# Patient Record
Sex: FEMALE | Race: BLACK OR AFRICAN AMERICAN | Employment: OTHER | URBAN - METROPOLITAN AREA
[De-identification: names, ages, dates, MRNs, and addresses within clinical notes are randomized per-mention and may not be internally consistent; named-entity substitution may affect disease eponyms.]

---

## 2017-10-23 ENCOUNTER — HOSPITAL ENCOUNTER (OUTPATIENT)
Dept: CT IMAGING | Age: 82
Discharge: HOME | End: 2017-10-23
Attending: NURSE PRACTITIONER
Payer: MEDICARE

## 2017-10-23 DIAGNOSIS — R10.32 LLQ ABDOMINAL PAIN: ICD-10-CM

## 2017-10-23 LAB — CREAT UR-MCNC: 0.8 MG/DL (ref 0.6–1.3)

## 2017-10-23 PROCEDURE — 74011636320 HC RX REV CODE- 636/320: Performed by: NURSE PRACTITIONER

## 2017-10-23 PROCEDURE — 82565 ASSAY OF CREATININE: CPT

## 2017-10-23 PROCEDURE — 74177 CT ABD & PELVIS W/CONTRAST: CPT

## 2017-10-23 RX ADMIN — DIATRIZOATE MEGLUMINE AND DIATRIZOATE SODIUM 30 ML: 600; 100 SOLUTION ORAL; RECTAL at 13:22

## 2017-10-23 RX ADMIN — IOPAMIDOL 100 ML: 612 INJECTION, SOLUTION INTRAVENOUS at 15:09

## 2018-02-15 ENCOUNTER — HOSPITAL ENCOUNTER (OUTPATIENT)
Dept: MRI IMAGING | Age: 83
Discharge: HOME | End: 2018-02-15
Attending: HOSPITALIST
Payer: MEDICARE

## 2018-02-15 DIAGNOSIS — M25.561 RIGHT KNEE PAIN: ICD-10-CM

## 2018-02-15 PROCEDURE — 73721 MRI JNT OF LWR EXTRE W/O DYE: CPT

## 2018-02-21 ENCOUNTER — OFFICE VISIT (OUTPATIENT)
Dept: ORTHOPEDIC SURGERY | Facility: CLINIC | Age: 83
End: 2018-02-21

## 2018-02-21 VITALS
TEMPERATURE: 97.4 F | HEART RATE: 36 BPM | HEIGHT: 65 IN | SYSTOLIC BLOOD PRESSURE: 137 MMHG | WEIGHT: 205.8 LBS | DIASTOLIC BLOOD PRESSURE: 52 MMHG | OXYGEN SATURATION: 96 % | BODY MASS INDEX: 34.29 KG/M2 | RESPIRATION RATE: 18 BRPM

## 2018-02-21 DIAGNOSIS — M25.561 CHRONIC PAIN OF RIGHT KNEE: Primary | ICD-10-CM

## 2018-02-21 DIAGNOSIS — G89.29 CHRONIC PAIN OF RIGHT KNEE: Primary | ICD-10-CM

## 2018-02-21 RX ORDER — MELOXICAM 7.5 MG/1
TABLET ORAL
COMMUNITY
Start: 2018-01-29

## 2018-02-21 RX ORDER — LATANOPROST 50 UG/ML
SOLUTION/ DROPS OPHTHALMIC
COMMUNITY
Start: 2018-02-05

## 2018-02-21 RX ORDER — LEVOTHYROXINE SODIUM 150 UG/1
TABLET ORAL
COMMUNITY
Start: 2017-11-29

## 2018-02-21 RX ORDER — GABAPENTIN 300 MG/1
CAPSULE ORAL
COMMUNITY
Start: 2018-01-03

## 2018-02-21 RX ORDER — BRIMONIDINE TARTRATE 2 MG/ML
SOLUTION/ DROPS OPHTHALMIC
COMMUNITY
Start: 2018-02-05

## 2018-02-21 RX ORDER — BISMUTH SUBSALICYLATE 262 MG
1 TABLET,CHEWABLE ORAL DAILY
COMMUNITY

## 2018-02-21 RX ORDER — BETAMETHASONE SODIUM PHOSPHATE AND BETAMETHASONE ACETATE 3; 3 MG/ML; MG/ML
6 INJECTION, SUSPENSION INTRA-ARTICULAR; INTRALESIONAL; INTRAMUSCULAR; SOFT TISSUE ONCE
Qty: 1 ML | Refills: 0
Start: 2018-02-21 | End: 2018-02-21

## 2018-02-21 RX ORDER — LOSARTAN POTASSIUM AND HYDROCHLOROTHIAZIDE 12.5; 5 MG/1; MG/1
TABLET ORAL
COMMUNITY
Start: 2018-01-25

## 2018-02-21 RX ORDER — CHOLECALCIFEROL TAB 125 MCG (5000 UNIT) 125 MCG
TAB ORAL DAILY
COMMUNITY

## 2018-02-21 RX ORDER — GUAIFENESIN 100 MG/5ML
81 LIQUID (ML) ORAL DAILY
COMMUNITY

## 2018-02-21 RX ORDER — METOPROLOL TARTRATE 25 MG/1
TABLET, FILM COATED ORAL
COMMUNITY
Start: 2018-01-25

## 2018-02-21 NOTE — PROGRESS NOTES
Patient: Jada Alejandra Person                MRN: [id number]       SSN: [id number]  YOB: 1935        AGE: 81 y.o. SEX: female  Body mass index is 34.25 kg/(m^2). PCP: No primary care provider on file. 02/21/18  HISTORY: I had the pleasure of seeing Jada Alejandra. She is a very nice lady who looks much younger than her stated age of 81, who has been having right knee pain for a few weeks. She has been avoiding stairs for quite some time. She has some pain at night and pain with activities of daily living. She has had no trauma. She has known atrial fibrillation. Her heart rate was a touch on the slow side today. She is going to be following up with Dr. Elayne Halsted. She denies chest pain or shortness of breath. The right knee pain is moderate and aching. She had an aspiration/injection about six to 10 years ago, which was somewhat efficacious for her. She has noticed she has gotten a little more knock-kneed. The left knee is also a problem but not as severely so. PHYSICAL EXAMINATION:  On examination today, she is a very nice lady in no acute distress. Vitals are stable. She appears much younger than her stated age of 81. She has had no respiratory compromise or indrawing. No scleral icterus or JVD. The hips rotate nicely. Both feet are warm and well perfused. There is no cyanosis or clubbing. She has just slight evidence of neuropathy distally but mildly so. She has some mild effusion in both knees. She has a couple degrees fixed flexion deformity, about 4-5° on the right and about 2° on the left. The calf is nontender. The back is not particularly painful today. 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      PROCEDURE:  Under aseptic conditions and after informed, written consent with a time out, the right knee was injected with 1 cc of the Celestone preparation, i.e. 6 mg, which was well tolerated. PLAN:  I will have her return to see us in about three weeks. We may do the left knee injection if she wishes. I think she may be heading eventually towards total knee replacement. It is really up to her. We will see how she gets along with the nonoperative measures. It has been and absolute pleasure to share in her care, and I appreciate the opportunity to provide opinion and advice with regards to her management. REVIEW OF SYSTEMS:      CON: negative for weight loss, fever  EYE: negative for double vision  ENT: negative for hoarseness  RS:   negative for Tb  GI:    negative for blood in stool  :  negative for blood in urine  Other systems reviewed and noted below. Past Medical History:   Diagnosis Date    A-fib ([facility]     Hypertension        History reviewed. No pertinent family history. Current Outpatient Prescriptions   Medication Sig Dispense Refill    metoprolol tartrate (LOPRESSOR) 25 mg tablet       meloxicam (MOBIC) 7.5 mg tablet       losartan-hydroCHLOROthiazide (HYZAAR) 50-12.5 mg per tablet       levothyroxine (SYNTHROID) 150 mcg tablet       gabapentin (NEURONTIN) 300 mg capsule       latanoprost (XALATAN) 0.005 % ophthalmic solution       brimonidine (ALPHAGAN) 0.2 % ophthalmic solution       aspirin 81 mg chewable tablet Take 81 mg by mouth daily.  cholecalciferol, VITAMIN D3, (VITAMIN D3) 5,000 unit tab tablet Take  by mouth daily.  multivitamin (ONE A DAY) tablet Take 1 Tab by mouth daily. No Known Allergies    History reviewed. No pertinent surgical history. Social History     Social History    Marital status: UNKNOWN     Spouse name: N/A    Number of children: N/A    Years of education: N/A     Occupational History    Not on file.      Social History Main Topics    Smoking status: Former Smoker    Smokeless tobacco: Never Used    Alcohol use No    Drug use: No    Sexual activity: Not on file     Other Topics Concern    Not on file     Social History Narrative    No narrative on file       Visit Vitals    /52    Pulse (!) 36    Temp 97.4 °F (36.3 °C) (Oral)    Resp 18    Ht 5' 5\" (1.651 m)    Wt 205 lb 12.8 oz (93.4 kg)    SpO2 96%    BMI 34.25 kg/m2         PHYSICAL EXAMINATION:  GENERAL: Alert and oriented x3, in no acute distress, well-developed, well-nourished, afebrile. HEART: No JVD. EYES: No scleral icterus   NECK: No significant lymphadenopathy   LUNGS: No respiratory compromise or indrawing  ABDOMEN: Soft, non-tender, non-distended. Electronically signed by:  Mercedes Zepeda MD

## 2018-10-11 ENCOUNTER — HOSPITAL ENCOUNTER (EMERGENCY)
Age: 83
Discharge: HOME | End: 2018-10-12
Attending: EMERGENCY MEDICINE
Payer: MEDICARE

## 2018-10-11 ENCOUNTER — APPOINTMENT (OUTPATIENT)
Dept: CT IMAGING | Age: 83
End: 2018-10-11
Attending: EMERGENCY MEDICINE
Payer: MEDICARE

## 2018-10-11 DIAGNOSIS — K57.92 DIVERTICULITIS: Primary | ICD-10-CM

## 2018-10-11 LAB
ANION GAP SERPL CALC-SCNC: 8 MMOL/L (ref 3–18)
BASOPHILS # BLD: 0 K/UL (ref 0–0.1)
BASOPHILS NFR BLD: 0 % (ref 0–2)
BUN SERPL-MCNC: 14 MG/DL (ref 7–18)
BUN/CREAT SERPL: 15 (ref 12–20)
CALCIUM SERPL-MCNC: 8.6 MG/DL (ref 8.5–10.1)
CHLORIDE SERPL-SCNC: 105 MMOL/L (ref 100–108)
CO2 SERPL-SCNC: 26 MMOL/L (ref 21–32)
CREAT SERPL-MCNC: 0.92 MG/DL (ref 0.6–1.3)
DIFFERENTIAL METHOD BLD: ABNORMAL
EOSINOPHIL # BLD: 0.1 K/UL (ref 0–0.4)
EOSINOPHIL NFR BLD: 1 % (ref 0–5)
ERYTHROCYTE [DISTWIDTH] IN BLOOD BY AUTOMATED COUNT: 15.3 % (ref 11.6–14.5)
GLUCOSE SERPL-MCNC: 115 MG/DL (ref 74–99)
HCT VFR BLD AUTO: 41.2 % (ref 35–45)
HGB BLD-MCNC: 13.4 G/DL (ref 12–16)
LYMPHOCYTES # BLD: 2.3 K/UL (ref 0.9–3.6)
LYMPHOCYTES NFR BLD: 23 % (ref 21–52)
MCH RBC QN AUTO: 27.4 PG (ref 24–34)
MCHC RBC AUTO-ENTMCNC: 32.5 G/DL (ref 31–37)
MCV RBC AUTO: 84.3 FL (ref 74–97)
MONOCYTES # BLD: 0.5 K/UL (ref 0.05–1.2)
MONOCYTES NFR BLD: 5 % (ref 3–10)
NEUTS SEG # BLD: 7.4 K/UL (ref 1.8–8)
NEUTS SEG NFR BLD: 71 % (ref 40–73)
PLATELET # BLD AUTO: 235 K/UL (ref 135–420)
PMV BLD AUTO: 10.3 FL (ref 9.2–11.8)
POTASSIUM SERPL-SCNC: 3.9 MMOL/L (ref 3.5–5.5)
RBC # BLD AUTO: 4.89 M/UL (ref 4.2–5.3)
SODIUM SERPL-SCNC: 139 MMOL/L (ref 136–145)
WBC # BLD AUTO: 10.3 K/UL (ref 4.6–13.2)

## 2018-10-11 PROCEDURE — 74176 CT ABD & PELVIS W/O CONTRAST: CPT

## 2018-10-11 PROCEDURE — C9113 INJ PANTOPRAZOLE SODIUM, VIA: HCPCS | Performed by: EMERGENCY MEDICINE

## 2018-10-11 PROCEDURE — 84484 ASSAY OF TROPONIN QUANT: CPT | Performed by: EMERGENCY MEDICINE

## 2018-10-11 PROCEDURE — 80053 COMPREHEN METABOLIC PANEL: CPT | Performed by: EMERGENCY MEDICINE

## 2018-10-11 PROCEDURE — 99284 EMERGENCY DEPT VISIT MOD MDM: CPT

## 2018-10-11 PROCEDURE — 96375 TX/PRO/DX INJ NEW DRUG ADDON: CPT

## 2018-10-11 PROCEDURE — 80048 BASIC METABOLIC PNL TOTAL CA: CPT | Performed by: EMERGENCY MEDICINE

## 2018-10-11 PROCEDURE — 83690 ASSAY OF LIPASE: CPT | Performed by: EMERGENCY MEDICINE

## 2018-10-11 PROCEDURE — 74011250636 HC RX REV CODE- 250/636: Performed by: EMERGENCY MEDICINE

## 2018-10-11 PROCEDURE — 85025 COMPLETE CBC W/AUTO DIFF WBC: CPT | Performed by: EMERGENCY MEDICINE

## 2018-10-11 PROCEDURE — 96374 THER/PROPH/DIAG INJ IV PUSH: CPT

## 2018-10-11 PROCEDURE — 93005 ELECTROCARDIOGRAM TRACING: CPT

## 2018-10-11 RX ORDER — PANTOPRAZOLE SODIUM 40 MG/10ML
40 INJECTION, POWDER, LYOPHILIZED, FOR SOLUTION INTRAVENOUS
Status: COMPLETED | OUTPATIENT
Start: 2018-10-11 | End: 2018-10-11

## 2018-10-11 RX ADMIN — PANTOPRAZOLE SODIUM 40 MG: 40 INJECTION, POWDER, FOR SOLUTION INTRAVENOUS at 23:30

## 2018-10-12 VITALS
DIASTOLIC BLOOD PRESSURE: 59 MMHG | OXYGEN SATURATION: 95 % | TEMPERATURE: 100.3 F | HEART RATE: 79 BPM | BODY MASS INDEX: 32.47 KG/M2 | WEIGHT: 202 LBS | HEIGHT: 66 IN | RESPIRATION RATE: 21 BRPM | SYSTOLIC BLOOD PRESSURE: 137 MMHG

## 2018-10-12 LAB
ALBUMIN SERPL-MCNC: 3.4 G/DL (ref 3.4–5)
ALBUMIN/GLOB SERPL: 0.8 {RATIO} (ref 0.8–1.7)
ALP SERPL-CCNC: 88 U/L (ref 45–117)
ALT SERPL-CCNC: 58 U/L (ref 13–56)
ANION GAP SERPL CALC-SCNC: 8 MMOL/L (ref 3–18)
APPEARANCE UR: ABNORMAL
AST SERPL-CCNC: 105 U/L (ref 15–37)
ATRIAL RATE: 78 BPM
BACTERIA URNS QL MICRO: ABNORMAL /HPF
BILIRUB SERPL-MCNC: 0.3 MG/DL (ref 0.2–1)
BILIRUB UR QL: NEGATIVE
BUN SERPL-MCNC: 14 MG/DL (ref 7–18)
BUN/CREAT SERPL: 15 (ref 12–20)
CALCIUM SERPL-MCNC: 8.6 MG/DL (ref 8.5–10.1)
CALCULATED P AXIS, ECG09: 0 DEGREES
CALCULATED R AXIS, ECG10: -13 DEGREES
CALCULATED T AXIS, ECG11: 61 DEGREES
CHLORIDE SERPL-SCNC: 105 MMOL/L (ref 100–108)
CO2 SERPL-SCNC: 26 MMOL/L (ref 21–32)
COLOR UR: YELLOW
CREAT SERPL-MCNC: 0.92 MG/DL (ref 0.6–1.3)
DIAGNOSIS, 93000: NORMAL
EPITH CASTS URNS QL MICRO: ABNORMAL /LPF (ref 0–5)
GLOBULIN SER CALC-MCNC: 4.4 G/DL (ref 2–4)
GLUCOSE SERPL-MCNC: 115 MG/DL (ref 74–99)
GLUCOSE UR STRIP.AUTO-MCNC: NEGATIVE MG/DL
HGB UR QL STRIP: NEGATIVE
KETONES UR QL STRIP.AUTO: NEGATIVE MG/DL
LEUKOCYTE ESTERASE UR QL STRIP.AUTO: ABNORMAL
LIPASE SERPL-CCNC: 133 U/L (ref 73–393)
NITRITE UR QL STRIP.AUTO: NEGATIVE
P-R INTERVAL, ECG05: 178 MS
PH UR STRIP: 5.5 [PH] (ref 5–8)
POTASSIUM SERPL-SCNC: 3.9 MMOL/L (ref 3.5–5.5)
PROT SERPL-MCNC: 7.8 G/DL (ref 6.4–8.2)
PROT UR STRIP-MCNC: NEGATIVE MG/DL
Q-T INTERVAL, ECG07: 382 MS
QRS DURATION, ECG06: 100 MS
QTC CALCULATION (BEZET), ECG08: 435 MS
SODIUM SERPL-SCNC: 139 MMOL/L (ref 136–145)
SP GR UR REFRACTOMETRY: 1.01 (ref 1–1.03)
TROPONIN I SERPL-MCNC: <0.02 NG/ML (ref 0–0.04)
UROBILINOGEN UR QL STRIP.AUTO: 0.2 EU/DL (ref 0.2–1)
VENTRICULAR RATE, ECG03: 78 BPM
WBC URNS QL MICRO: ABNORMAL /HPF (ref 0–4)

## 2018-10-12 PROCEDURE — 74011250636 HC RX REV CODE- 250/636: Performed by: EMERGENCY MEDICINE

## 2018-10-12 PROCEDURE — 81001 URINALYSIS AUTO W/SCOPE: CPT | Performed by: EMERGENCY MEDICINE

## 2018-10-12 RX ORDER — AMOXICILLIN AND CLAVULANATE POTASSIUM 875; 125 MG/1; MG/1
1 TABLET, FILM COATED ORAL
Status: DISCONTINUED | OUTPATIENT
Start: 2018-10-12 | End: 2018-10-12

## 2018-10-12 RX ORDER — AMOXICILLIN AND CLAVULANATE POTASSIUM 875; 125 MG/1; MG/1
1 TABLET, FILM COATED ORAL 3 TIMES DAILY
Qty: 30 TAB | Refills: 0 | Status: SHIPPED | OUTPATIENT
Start: 2018-10-12 | End: 2018-10-22

## 2018-10-12 RX ORDER — ONDANSETRON HYDROCHLORIDE 4 MG/2ML
4 INJECTION, SOLUTION INTRAMUSCULAR; INTRAVENOUS
Status: COMPLETED | OUTPATIENT
Start: 2018-10-12 | End: 2018-10-12

## 2018-10-12 RX ADMIN — ONDANSETRON HYDROCHLORIDE 4 MG: 4 INJECTION, SOLUTION INTRAMUSCULAR; INTRAVENOUS at 01:10

## 2018-10-12 NOTE — ED PROVIDER NOTES
EMERGENCY DEPARTMENT HISTORY AND PHYSICAL EXAM 
 
10:52 PM 
 
 
Date: 10/11/2018 Patient Name: Stevie Jurado Person History of Presenting Illness Chief Complaint Patient presents with  Abdominal Pain  Chills  Nausea History Provided By: Patient Chief Complaint: Abdominal pain Duration: 1 Days Timing:  Constant Location: epigastric and suprapubic Quality: Aching Severity: Moderate Modifying Factors: Ibuprofen, no relief of Sx  
Associated Symptoms: fever and dry-heaves Additional History (Context): Arielle Herrmann is a 81 y.o. female with PMHx of Afib and HTN who presents with c/o moderate constant aching epigastric and suprapubic abdominal pain that started 1 day ago with associated Sx of fever and dry-heaves. Pt states she has a hx of diverticulitis. Pt states she takes Ibuprofen everyday, with no relief of Sx. Pt denies hx of reflux, gallbladder problems, and abdominal surgeries. Pt has no hx of MI or stents. Pt denies ETOH use. Pt denies dysuria. Denies any further complaints or symptoms at the moment. PCP: Marychuy Vasques DO 
 
Current Facility-Administered Medications Medication Dose Route Frequency Provider Last Rate Last Dose  amoxicillin-clavulanate (AUGMENTIN) 875-125 mg per tablet 1 Tab  1 Tab Oral NOW Zebedee Simmonds, MD      
 
Current Outpatient Prescriptions Medication Sig Dispense Refill  amoxicillin-clavulanate (AUGMENTIN) 875-125 mg per tablet Take 1 Tab by mouth three (3) times daily for 10 days. 30 Tab 0  
 metoprolol tartrate (LOPRESSOR) 25 mg tablet  meloxicam (MOBIC) 7.5 mg tablet  losartan-hydroCHLOROthiazide (HYZAAR) 50-12.5 mg per tablet  levothyroxine (SYNTHROID) 150 mcg tablet  gabapentin (NEURONTIN) 300 mg capsule  latanoprost (XALATAN) 0.005 % ophthalmic solution  brimonidine (ALPHAGAN) 0.2 % ophthalmic solution  aspirin 81 mg chewable tablet Take 81 mg by mouth daily.  cholecalciferol, VITAMIN D3, (VITAMIN D3) 5,000 unit tab tablet Take  by mouth daily.  multivitamin (ONE A DAY) tablet Take 1 Tab by mouth daily. Past History Past Medical History: 
Past Medical History:  
Diagnosis Date  A-fib ([address]  Hypertension Past Surgical History: 
History reviewed. No pertinent surgical history. Family History: 
History reviewed. No pertinent family history. Social History: 
Social History Substance Use Topics  Smoking status: Former Smoker  Smokeless tobacco: Never Used  Alcohol use No  
 
 
Allergies: 
No Known Allergies Review of Systems Review of Systems Constitutional: Positive for fever. Gastrointestinal: Positive for abdominal pain. Positive for dry-heaves All other systems reviewed and are negative. Physical Exam  
 
Visit Vitals  /59  Pulse 79  Temp 100.3 °F (37.9 °C)  Resp 21  
 Ht 5' 6\" (1.676 m)  Wt 91.6 kg (202 lb)  SpO2 95%  BMI 32.6 kg/m2 Physical Exam  
Abdominal: There is tenderness in the epigastric area and suprapubic area. There is no rebound and no guarding. Physical Exam: 
. Patient Vitals for the past 12 hrs: 
 Temp Pulse Resp BP SpO2  
10/12/18 0115 - 79 21 137/59 95 % 10/12/18 0000 - 74 22 126/59 96 % 10/11/18 2345 - 71 19 (!) 143/95 95 % 10/11/18 2315 - 73 21 134/57 97 % 10/11/18 2300 - 85 19 159/54 93 % 10/11/18 2148 100.3 °F (37.9 °C) 98 18 141/54 99 % Gen: Well developed, well nourished 81 y.o. female HEENT: Normocephalic, atraumatic Respiratory: No accessory muscle use No wheeze, No rales, No rhonchi. Normal chest wall excursion. No subcutaneous air, no rib crepitus Cardiovascular: Regular rhythm and rate, Normal pulses, Normal perfusion. No edema Gastrointestinal: Non distended, Non tender, No masses. No ascites. No organomegaly. No evidence of trauma Musculoskeletal: Full range of motion at all other tested joints. No joint effusions. Neuro: Normal strength, Normal sensation. Normal speech. No ataxia. Cranial Nerves II-XII normal as tested. Skin: No rash, petechia or purpura. Warm and dry Psyche: No suicidal ideation, No homicidal ideation. No hallucinations. Organized thoughts. Heme: Normal 
: Deferred Diagnostic Study Results Labs - Recent Results (from the past 12 hour(s)) EKG, 12 LEAD, INITIAL Collection Time: 10/11/18 10:06 PM  
Result Value Ref Range Ventricular Rate 78 BPM  
 Atrial Rate 78 BPM  
 P-R Interval 178 ms QRS Duration 100 ms Q-T Interval 382 ms QTC Calculation (Bezet) 435 ms Calculated P Axis 0 degrees Calculated R Axis -13 degrees Calculated T Axis 61 degrees Diagnosis Sinus rhythm with frequent premature ventricular complexes Minimal voltage criteria for LVH, may be normal variant Nonspecific ST abnormality Abnormal ECG No previous ECGs available CBC WITH AUTOMATED DIFF Collection Time: 10/11/18 10:17 PM  
Result Value Ref Range WBC 10.3 4.6 - 13.2 K/uL  
 RBC 4.89 4.20 - 5.30 M/uL  
 HGB 13.4 12.0 - 16.0 g/dL HCT 41.2 35.0 - 45.0 % MCV 84.3 74.0 - 97.0 FL  
 MCH 27.4 24.0 - 34.0 PG  
 MCHC 32.5 31.0 - 37.0 g/dL  
 RDW 15.3 (H) 11.6 - 14.5 % PLATELET 262 641 - 217 K/uL MPV 10.3 9.2 - 11.8 FL  
 NEUTROPHILS 71 40 - 73 % LYMPHOCYTES 23 21 - 52 % MONOCYTES 5 3 - 10 % EOSINOPHILS 1 0 - 5 % BASOPHILS 0 0 - 2 %  
 ABS. NEUTROPHILS 7.4 1.8 - 8.0 K/UL  
 ABS. LYMPHOCYTES 2.3 0.9 - 3.6 K/UL  
 ABS. MONOCYTES 0.5 0.05 - 1.2 K/UL  
 ABS. EOSINOPHILS 0.1 0.0 - 0.4 K/UL  
 ABS. BASOPHILS 0.0 0.0 - 0.1 K/UL  
 DF AUTOMATED METABOLIC PANEL, BASIC Collection Time: 10/11/18 10:17 PM  
Result Value Ref Range Sodium 139 136 - 145 mmol/L Potassium 3.9 3.5 - 5.5 mmol/L Chloride 105 100 - 108 mmol/L  
 CO2 26 21 - 32 mmol/L  Anion gap 8 3.0 - 18 mmol/L  
 Glucose 115 (H) 74 - 99 mg/dL BUN 14 7.0 - 18 MG/DL Creatinine 0.92 0.6 - 1.3 MG/DL  
 BUN/Creatinine ratio 15 12 - 20 GFR est AA >60 >60 ml/min/1.73m2 GFR est non-AA 58 (L) >60 ml/min/1.73m2 Calcium 8.6 8.5 - 10.1 MG/DL URINALYSIS W/ RFLX MICROSCOPIC Collection Time: 10/12/18  1:04 AM  
Result Value Ref Range Color YELLOW Appearance CLOUDY Specific gravity 1.008 1.005 - 1.030    
 pH (UA) 5.5 5.0 - 8.0 Protein NEGATIVE  NEG mg/dL Glucose NEGATIVE  NEG mg/dL Ketone NEGATIVE  NEG mg/dL Bilirubin NEGATIVE  NEG Blood NEGATIVE  NEG Urobilinogen 0.2 0.2 - 1.0 EU/dL Nitrites NEGATIVE  NEG Leukocyte Esterase TRACE (A) NEG URINE MICROSCOPIC ONLY Collection Time: 10/12/18  1:04 AM  
Result Value Ref Range WBC 1 to 4 0 - 4 /hpf Epithelial cells FEW 0 - 5 /lpf Bacteria FEW (A) NEG /hpf Radiologic Studies -  
CT ABD PELV WO CONT Final Result Ct Abd Pelv Wo Cont Result Date: 10/12/2018 IMPRESSION[de-identified] 1.  Mild acute sigmoid diverticulitis. Similar appearance and same location as episode in October 2017. 2. Left ovarian cyst or cysts. One of these could be previously hemorrhagic. An alternative consideration is pedunculated fibroid. Unchanged in appearance. 3. Likely liver cysts. Thank you for this referral. 
 
 
Medical Decision Making I am the first provider for this patient. I reviewed the vital signs, available nursing notes, past medical history, past surgical history, family history and social history. Vital Signs-Reviewed the patient's vital signs. Pulse Oximetry Analysis -  99% on room air (Interpretation) normal 
 
 
 
Records Reviewed: Nursing Notes (Time of Review: 10:52 PM) Diagnosis Clinical Impression: 1. Diverticulitis Disposition: discharged Follow-up Information Follow up With Details Comments Contact Info Belen Penn DO Call in 1 day  [address] [facility] [address] 
[phone] Patient's Medications Start Taking AMOXICILLIN-CLAVULANATE (AUGMENTIN) 875-125 MG PER TABLET    Take 1 Tab by mouth three (3) times daily for 10 days. Continue Taking ASPIRIN 81 MG CHEWABLE TABLET    Take 81 mg by mouth daily. BRIMONIDINE (ALPHAGAN) 0.2 % OPHTHALMIC SOLUTION      
 CHOLECALCIFEROL, VITAMIN D3, (VITAMIN D3) 5,000 UNIT TAB TABLET    Take  by mouth daily. GABAPENTIN (NEURONTIN) 300 MG CAPSULE      
 LATANOPROST (XALATAN) 0.005 % OPHTHALMIC SOLUTION      
 LEVOTHYROXINE (SYNTHROID) 150 MCG TABLET      
 LOSARTAN-HYDROCHLOROTHIAZIDE (HYZAAR) 50-12.5 MG PER TABLET      
 MELOXICAM (MOBIC) 7.5 MG TABLET      
 METOPROLOL TARTRATE (LOPRESSOR) 25 MG TABLET      
 MULTIVITAMIN (ONE A DAY) TABLET    Take 1 Tab by mouth daily. These Medications have changed No medications on file Stop Taking No medications on file  
 
_______________________________ Attestations: 
Scribe Attestation Alyssia Martinez acting as a scribe for and in the presence of Yi Daigle MD     
October 11, 2018 at 10:52 PM 
    
Provider Attestation:     
I personally performed the services described in the documentation, reviewed the documentation, as recorded by the scribe in my presence, and it accurately and completely records my words and actions. October 11, 2018 at 10:52 PM - Mayank Amato MD   
_______________________________

## 2018-10-12 NOTE — DISCHARGE INSTRUCTIONS
Diverticulitis: Care Instructions  Your Care Instructions    Diverticulitis occurs when pouches form in the wall of the colon and become inflamed or infected. It can be very painful. Doctors aren't sure what causes diverticulitis. There is no proof that foods such as nuts, seeds, or berries cause it or make it worse. A low-fiber diet may cause the colon to work harder to push stool forward. Pouches may form because of this extra work. It may be hard to think about healthy eating while you're in pain. But as you recover, you might think about how you can use healthy eating for overall better health. Healthy eating may help you avoid future attacks. Follow-up care is a key part of your treatment and safety. Be sure to make and go to all appointments, and call your doctor if you are having problems. It's also a good idea to know your test results and keep a list of the medicines you take. How can you care for yourself at home? · Drink plenty of fluids, enough so that your urine is light yellow or clear like water. If you have kidney, heart, or liver disease and have to limit fluids, talk with your doctor before you increase the amount of fluids you drink. · Stick to liquids or a bland diet (plain rice, bananas, dry toast or crackers, applesauce) until you are feeling better. Then you can return to regular foods and gradually increase the amount of fiber in your diet. · Use a heating pad set on low on your belly to relieve mild cramps and pain. · Get extra rest until you are feeling better. · Be safe with medicines. Read and follow all instructions on the label. ¨ If the doctor gave you a prescription medicine for pain, take it as prescribed. ¨ If you are not taking a prescription pain medicine, ask your doctor if you can take an over-the-counter medicine. · If your doctor prescribed antibiotics, take them as directed. Do not stop taking them just because you feel better.  You need to take the full course of antibiotics. To prevent future attacks of diverticulitis  · Avoid constipation:  ¨ Include fruits, vegetables, beans, and whole grains in your diet each day. These foods are high in fiber. ¨ Drink plenty of fluids, enough so that your urine is light yellow or clear like water. If you have kidney, heart, or liver disease and have to limit fluids, talk with your doctor before you increase the amount of fluids you drink. ¨ Get some exercise every day. Build up slowly to 30 to 60 minutes a day on 5 or more days of the week. ¨ Take a fiber supplement, such as Citrucel or Metamucil, every day if needed. Read and follow all instructions on the label. ¨ Schedule time each day for a bowel movement. Having a daily routine may help. Take your time and do not strain when having a bowel movement. When should you call for help? Call your doctor now or seek immediate medical care if:    · You have a fever.     · You are vomiting.     · You have new or worse belly pain.     · You cannot pass stools or gas.    Watch closely for changes in your health, and be sure to contact your doctor if you have any problems. Where can you learn more? Go to http://андрей-konrad.info/. Enter H901 in the search box to learn more about \"Diverticulitis: Care Instructions. \"  Current as of: March 28, 2018  Content Version: 11.8  © 2155-4155 [employer]. Care instructions adapted under license by [employer] (which disclaims liability or warranty for this information). If you have questions about a medical condition or this instruction, always ask your healthcare professional. [place] [address] any warranty or liability for your use of this information.

## 2021-06-01 ENCOUNTER — TRANSCRIBE ORDER (OUTPATIENT)
Dept: SCHEDULING | Age: 86
End: 2021-06-01

## 2021-06-01 DIAGNOSIS — I42.9 CARDIOMYOPATHY (HCC): Primary | ICD-10-CM

## 2021-06-11 ENCOUNTER — HOSPITAL ENCOUNTER (OUTPATIENT)
Dept: NON INVASIVE DIAGNOSTICS | Age: 86
Discharge: HOME | End: 2021-06-11
Attending: INTERNAL MEDICINE
Payer: MEDICARE

## 2021-06-11 ENCOUNTER — HOSPITAL ENCOUNTER (OUTPATIENT)
Dept: NUCLEAR MEDICINE | Age: 86
Discharge: HOME | End: 2021-06-11
Attending: INTERNAL MEDICINE
Payer: MEDICARE

## 2021-06-11 VITALS
SYSTOLIC BLOOD PRESSURE: 140 MMHG | WEIGHT: 200 LBS | HEIGHT: 66 IN | BODY MASS INDEX: 32.14 KG/M2 | DIASTOLIC BLOOD PRESSURE: 79 MMHG

## 2021-06-11 VITALS
SYSTOLIC BLOOD PRESSURE: 161 MMHG | HEIGHT: 66 IN | DIASTOLIC BLOOD PRESSURE: 94 MMHG | BODY MASS INDEX: 32.14 KG/M2 | WEIGHT: 200 LBS

## 2021-06-11 DIAGNOSIS — I42.8 OTHER CARDIOMYOPATHY (HCC): ICD-10-CM

## 2021-06-11 DIAGNOSIS — I42.9 CARDIOMYOPATHY (HCC): ICD-10-CM

## 2021-06-11 LAB
STRESS BASELINE DIAS BP: 94 MMHG
STRESS BASELINE HR: 76 BPM
STRESS BASELINE SYS BP: 161 MMHG
STRESS ESTIMATED WORKLOAD: 1 METS
STRESS EXERCISE DUR MIN: NORMAL
STRESS PEAK DIAS BP: 94 MMHG
STRESS PEAK SYS BP: 161 MMHG
STRESS PERCENT HR ACHIEVED: 80 %
STRESS POST PEAK HR: 109 BPM
STRESS RATE PRESSURE PRODUCT: NORMAL BPM*MMHG
STRESS ST DEPRESSION: 0 MM
STRESS ST ELEVATION: 0 MM
STRESS TARGET HR: 136 BPM

## 2021-06-11 PROCEDURE — A9500 TC99M SESTAMIBI: HCPCS

## 2021-06-11 PROCEDURE — 93018 CV STRESS TEST I&R ONLY: CPT | Performed by: INTERNAL MEDICINE

## 2021-06-11 PROCEDURE — 74011250636 HC RX REV CODE- 250/636

## 2021-06-11 PROCEDURE — 78452 HT MUSCLE IMAGE SPECT MULT: CPT | Performed by: INTERNAL MEDICINE

## 2021-06-11 PROCEDURE — 93017 CV STRESS TEST TRACING ONLY: CPT

## 2021-06-11 PROCEDURE — 93016 CV STRESS TEST SUPVJ ONLY: CPT | Performed by: INTERNAL MEDICINE

## 2021-06-11 RX ORDER — SODIUM CHLORIDE 9 MG/ML
250 INJECTION, SOLUTION INTRAVENOUS ONCE
Status: COMPLETED | OUTPATIENT
Start: 2021-06-11 | End: 2021-06-11

## 2021-06-11 RX ORDER — TETRAKIS(2-METHOXYISOBUTYLISOCYANIDE)COPPER(I) TETRAFLUOROBORATE 1 MG/ML
10.7 INJECTION, POWDER, LYOPHILIZED, FOR SOLUTION INTRAVENOUS
Status: COMPLETED | OUTPATIENT
Start: 2021-06-11 | End: 2021-06-11

## 2021-06-11 RX ADMIN — TETRAKIS(2-METHOXYISOBUTYLISOCYANIDE)COPPER(I) TETRAFLUOROBORATE 10.7 MILLICURIE: 1 INJECTION, POWDER, LYOPHILIZED, FOR SOLUTION INTRAVENOUS at 09:20

## 2021-06-11 RX ADMIN — SODIUM CHLORIDE 250 ML: 900 INJECTION, SOLUTION INTRAVENOUS at 12:00

## 2021-06-11 RX ADMIN — REGADENOSON 0.4 MG: 0.08 INJECTION, SOLUTION INTRAVENOUS at 12:00

## 2021-07-19 ENCOUNTER — HOSPITAL ENCOUNTER (EMERGENCY)
Age: 86
Discharge: HOME | End: 2021-07-19
Attending: EMERGENCY MEDICINE
Payer: MEDICARE

## 2021-07-19 VITALS
SYSTOLIC BLOOD PRESSURE: 137 MMHG | HEART RATE: 85 BPM | TEMPERATURE: 98.8 F | OXYGEN SATURATION: 98 % | BODY MASS INDEX: 32.28 KG/M2 | RESPIRATION RATE: 16 BRPM | DIASTOLIC BLOOD PRESSURE: 98 MMHG | WEIGHT: 200 LBS

## 2021-07-19 DIAGNOSIS — B02.9 HERPES ZOSTER WITHOUT COMPLICATION: Primary | ICD-10-CM

## 2021-07-19 PROCEDURE — 99281 EMR DPT VST MAYX REQ PHY/QHP: CPT

## 2021-07-19 RX ORDER — ACYCLOVIR 400 MG/1
800 TABLET ORAL
Qty: 100 TABLET | Refills: 0 | Status: SHIPPED | OUTPATIENT
Start: 2021-07-19 | End: 2021-07-29

## 2021-07-19 RX ORDER — LIDOCAINE 3% TOPICAL ANESTHETIC CREAM 0.03 G/G
1 CREAM TOPICAL
Qty: 28.35 G | Refills: 0 | Status: SHIPPED | OUTPATIENT
Start: 2021-07-19 | End: 2021-07-26

## 2021-07-19 RX ORDER — CEPHALEXIN 500 MG/1
1000 CAPSULE ORAL 2 TIMES DAILY
Qty: 28 CAPSULE | Refills: 0 | Status: SHIPPED | OUTPATIENT
Start: 2021-07-19 | End: 2021-07-26

## 2021-07-19 NOTE — ED TRIAGE NOTES
Pt arrived for insect bite on right ankle with shooting pain up the leg starting last night.   Hx HTN, AFIB on blood thinners,

## 2021-07-19 NOTE — ED PROVIDER NOTES
EMERGENCY DEPARTMENT HISTORY AND PHYSICAL EXAM    6:05 PM      Date: 7/19/2021  Patient Name: Brendon Shelby Person    History of Presenting Illness     Chief Complaint   Patient presents with   Johana Alvarado 84         History Provided By: Patient    Additional History (Context): Oziel Prince is a 81 y.o. female with past medical history significant for atrial fibrillationon Coumadin, hypertension, mixed connective tissue disease and polymyalgia rheumaticaon chronic steroids, cardiomyopathy, gout who presents with complaints of a bug bite to the top of the right foot. Patient states while in her home yesterday she noticed redness, pain and burning to the top of the right foot. Thought she got bit by an insect bites that she applied calamine lotion. States today the pain got worse. The pain seems to radiate from the spot on the bug bite to the back of the foot with burning and itching locally. She denies any leg pain or swelling. No history of blood clots. She denies any injury, trauma, or fall. She did see her PCP today at [facility] [place] and was given triamcinolone cream which she applied and made it significantly worse with burning so she came to the ER. PCP: Nikolai Arriaga MD    Current Outpatient Medications   Medication Sig Dispense Refill    acyclovir (ZOVIRAX) 400 mg tablet Take 2 Tablets by mouth five (5) times daily for 10 days. 100 Tablet 0    cephALEXin (Keflex) 500 mg capsule Take 2 Capsules by mouth two (2) times a day for 7 days. 28 Capsule 0    lidocaine 3 % crea 1 Dose by Apply Externally route two (2) times daily as needed for Pain for up to 7 days.  28.35 g 0    metoprolol tartrate (LOPRESSOR) 25 mg tablet       meloxicam (MOBIC) 7.5 mg tablet       losartan-hydroCHLOROthiazide (HYZAAR) 50-12.5 mg per tablet       levothyroxine (SYNTHROID) 150 mcg tablet       gabapentin (NEURONTIN) 300 mg capsule       latanoprost (XALATAN) 0.005 % ophthalmic solution       brimonidine (ALPHAGAN) 0.2 % ophthalmic solution       aspirin 81 mg chewable tablet Take 81 mg by mouth daily.  cholecalciferol, VITAMIN D3, (VITAMIN D3) 5,000 unit tab tablet Take  by mouth daily.  multivitamin (ONE A DAY) tablet Take 1 Tab by mouth daily. Past History     Past Medical History:  Past Medical History:   Diagnosis Date    A-fib ([facility] [address]     Hypertension        Past Surgical History:  No past surgical history on file. Family History:  No family history on file. Social History:  Social History     Tobacco Use    Smoking status: Former Smoker    Smokeless tobacco: Never Used   Substance Use Topics    Alcohol use: No    Drug use: No       Allergies:  No Known Allergies      Review of Systems       Review of Systems   Constitutional: Negative. Negative for chills and fever. HENT: Negative. Negative for congestion, ear pain and rhinorrhea. Eyes: Negative. Negative for pain and redness. Respiratory: Negative. Negative for cough and shortness of breath. Cardiovascular: Negative. Negative for chest pain, palpitations and leg swelling. Gastrointestinal: Negative. Negative for abdominal pain, constipation, diarrhea, nausea and vomiting. Genitourinary: Negative. Negative for dysuria, frequency, hematuria and urgency. Musculoskeletal: Positive for arthralgias (Right foot pain). Negative for back pain, gait problem, joint swelling and neck pain. Skin: Positive for color change (Redness) and wound (Insect bite right foot). Negative for rash. Neurological: Negative. Negative for dizziness, seizures, speech difficulty, weakness, light-headedness and headaches. Hematological: Negative for adenopathy. Does not bruise/bleed easily. All other systems reviewed and are negative. Physical Exam     Visit Vitals  BP (!) 137/98   Pulse 85   Temp 98.8 °F (37.1 °C)   Resp 16   Wt 90.7 kg (200 lb)   SpO2 98%   BMI 32.28 kg/m²         Physical Exam  Vitals and nursing note reviewed. Constitutional:       General: She is not in acute distress. Appearance: Normal appearance. She is not ill-appearing, toxic-appearing or diaphoretic. HENT:      Head: Normocephalic and atraumatic. Nose: Nose normal.   Eyes:      General: Lids are normal. Vision grossly intact. Conjunctiva/sclera: Conjunctivae normal.   Cardiovascular:      Rate and Rhythm: Normal rate. Rhythm irregular. Pulses: Normal pulses. Heart sounds: Normal heart sounds. Pulmonary:      Effort: Pulmonary effort is normal.      Breath sounds: Normal breath sounds. Musculoskeletal:         General: Normal range of motion. Cervical back: Full passive range of motion without pain, normal range of motion and neck supple. Right ankle: Tenderness (Locally over the area of redness) present. Right Achilles Tendon: Lomax's test negative. Right foot: Normal range of motion. No swelling or bony tenderness. Normal pulse. Comments: No posterior calf pain or swelling. No palpable cord. Skin:     General: Skin is warm and dry. Capillary Refill: Capillary refill takes less than 2 seconds. Findings: Erythema and rash present. Comments: Full active range of motion of the right foot and ankle. Neurological:      General: No focal deficit present. Mental Status: She is alert and oriented to person, place, and time. Psychiatric:         Mood and Affect: Mood normal.         Behavior: Behavior normal. Behavior is cooperative. Diagnostic Study Results     Labs -  No results found for this or any previous visit (from the past 12 hour(s)). Radiologic Studies -   No orders to display         Medical Decision Making   I am the first provider for this patient. I reviewed available nursing notes, past medical history, past surgical history, family history and social history. Vital Signs-Reviewed the patient's vital signs.     Records Reviewed: Nursing Notes and Old Medical Records (Time of Review: 6:05 PM)    Pulse Oximetry Analysis - 98% on RA- normal     ED Course: Progress Notes, Reevaluation, and Consults:  6:05 PM  Initial assessment performed. The patients presenting problems have been discussed, and they/their family are in agreement with the care plan formulated and outlined with them. I have encouraged them to ask questions as they arise throughout their visit. Provider Notes (Medical Decision Making):     Patient is an 71-year-old female who presents to the ER with complaints of an insect bite to the right foot. She has significant pain with associated burning, stinging, and seems to follow a dermatomal distribution. There is no evidence of a retained stinger, cellulitis or abscess. There is no drainage. She has full active range of motion of the joints of the ankle and foot. She is on Coumadin for A. fib so I doubt DVT. She had no injury, trauma, or fall. Suspect possible early zoster versus early cellulitis secondary to an insect bite. We will treat her for both with acyclovir and cephalexin and will give her lidocaine topically to apply. She is immunosuppressed on chronic prednisone for her autoimmune conditions so it makes infection bacterial or viral more likely. Patient will need close follow-up in 24 hours for recheck. I added a photo of the area of redness for comparison purposes on recheck. Will obtain appropriate studies to evaluate patient's complaints and treat symptomatically. Will disposition after reassessment assuming no clinical change or worsening and appropriate response to symptomatic treatment. Diagnosis     Clinical Impression:   1. Herpes zoster without complication        Disposition: Discharged home in stable condition    DISCHARGE NOTE:     Patient has been reexamined. Patient has no new complaints, changes, or physical findings. Care plan outlined and precautions discussed.   Results of physical exam findings were reviewed with the patient. All medications were reviewed with the patient; will discharge home with acyclovir and cephalexin. All of patient's questions and concerns were addressed. Patient was instructed and agrees to follow up with PCP or this ER in 1 day, as well as to return to the ED upon further deterioration. Patient is ready to go home. Follow-up Information     Follow up With Specialties Details Why Contact Info    Fifi Cordova MD Internal Medicine Schedule an appointment as soon as possible for a visit  Follow-up from the Emergency Department [facility] [address]  [phone]      [facility] EMERGENCY DEPT Emergency Medicine  As needed, If symptoms worsen [address] [place] [id number]  [phone]           Discharge Medication List as of 7/19/2021  6:26 PM      START taking these medications    Details   acyclovir (ZOVIRAX) 400 mg tablet Take 2 Tablets by mouth five (5) times daily for 10 days. , Normal, Disp-100 Tablet, R-0      cephALEXin (Keflex) 500 mg capsule Take 2 Capsules by mouth two (2) times a day for 7 days. , Normal, Disp-28 Capsule, R-0      lidocaine 3 % crea 1 Dose by Apply Externally route two (2) times daily as needed for Pain for up to 7 days. , Normal, Disp-28.35 g, R-0         CONTINUE these medications which have NOT CHANGED    Details   metoprolol tartrate (LOPRESSOR) 25 mg tablet Historical Med      meloxicam (MOBIC) 7.5 mg tablet Historical Med      losartan-hydroCHLOROthiazide (HYZAAR) 50-12.5 mg per tablet Historical Med      levothyroxine (SYNTHROID) 150 mcg tablet Historical Med      gabapentin (NEURONTIN) 300 mg capsule Historical Med      latanoprost (XALATAN) 0.005 % ophthalmic solution Historical Med      brimonidine (ALPHAGAN) 0.2 % ophthalmic solution Historical Med      aspirin 81 mg chewable tablet Take 81 mg by mouth daily. , Historical Med      cholecalciferol, VITAMIN D3, (VITAMIN D3) 5,000 unit tab tablet Take  by mouth daily. , Historical Med      multivitamin (ONE A DAY) tablet Take 1 Tab by mouth daily. , Historical Med               Dictation disclaimer:  Please note that this dictation was completed with Pelago, the computer voice recognition software. Quite often unanticipated grammatical, syntax, homophones, and other interpretive errors are inadvertently transcribed by the computer software. Please disregard these errors. Please excuse any errors that have escaped final proofreading.

## 2022-04-13 ENCOUNTER — TRANSCRIBE ORDER (OUTPATIENT)
Dept: SCHEDULING | Age: 87
End: 2022-04-13

## 2022-04-13 DIAGNOSIS — Z79.52 LONG TERM CURRENT USE OF SYSTEMIC STEROIDS: Primary | ICD-10-CM

## 2022-04-13 DIAGNOSIS — M81.6 LOCALIZED OSTEOPOROSIS: ICD-10-CM

## 2022-10-23 ENCOUNTER — APPOINTMENT (OUTPATIENT)
Dept: VASCULAR SURGERY | Age: 87
End: 2022-10-23
Attending: EMERGENCY MEDICINE
Payer: MEDICARE

## 2022-10-23 ENCOUNTER — HOSPITAL ENCOUNTER (EMERGENCY)
Age: 87
Discharge: HOME | End: 2022-10-23
Attending: EMERGENCY MEDICINE
Payer: MEDICARE

## 2022-10-23 ENCOUNTER — APPOINTMENT (OUTPATIENT)
Dept: CT IMAGING | Age: 87
End: 2022-10-23
Attending: EMERGENCY MEDICINE
Payer: MEDICARE

## 2022-10-23 VITALS
SYSTOLIC BLOOD PRESSURE: 130 MMHG | DIASTOLIC BLOOD PRESSURE: 59 MMHG | OXYGEN SATURATION: 100 % | TEMPERATURE: 98.7 F | BODY MASS INDEX: 30.22 KG/M2 | RESPIRATION RATE: 26 BRPM | HEIGHT: 66 IN | HEART RATE: 90 BPM | WEIGHT: 188 LBS

## 2022-10-23 DIAGNOSIS — E87.6 HYPOKALEMIA: ICD-10-CM

## 2022-10-23 DIAGNOSIS — K57.32 RECTOSIGMOID DIVERTICULITIS: Primary | ICD-10-CM

## 2022-10-23 LAB
ALBUMIN SERPL-MCNC: 2.9 G/DL (ref 3.4–5)
ALBUMIN/GLOB SERPL: 0.6 {RATIO} (ref 0.8–1.7)
ALP SERPL-CCNC: 75 U/L (ref 45–117)
ALT SERPL-CCNC: 17 U/L (ref 13–56)
ANION GAP SERPL CALC-SCNC: 5 MMOL/L (ref 3–18)
AST SERPL-CCNC: 14 U/L (ref 10–38)
BASOPHILS # BLD: 0 K/UL (ref 0–0.1)
BASOPHILS NFR BLD: 0 % (ref 0–2)
BILIRUB SERPL-MCNC: 0.4 MG/DL (ref 0.2–1)
BUN SERPL-MCNC: 13 MG/DL (ref 7–18)
BUN/CREAT SERPL: 16 (ref 12–20)
CALCIUM SERPL-MCNC: 9 MG/DL (ref 8.5–10.1)
CHLORIDE SERPL-SCNC: 102 MMOL/L (ref 100–111)
CO2 SERPL-SCNC: 30 MMOL/L (ref 21–32)
CREAT SERPL-MCNC: 0.83 MG/DL (ref 0.6–1.3)
DIFFERENTIAL METHOD BLD: ABNORMAL
EOSINOPHIL # BLD: 0.1 K/UL (ref 0–0.4)
EOSINOPHIL NFR BLD: 2 % (ref 0–5)
ERYTHROCYTE [DISTWIDTH] IN BLOOD BY AUTOMATED COUNT: 15 % (ref 11.6–14.5)
GLOBULIN SER CALC-MCNC: 5.1 G/DL (ref 2–4)
GLUCOSE SERPL-MCNC: 115 MG/DL (ref 74–99)
HCT VFR BLD AUTO: 38.7 % (ref 35–45)
HGB BLD-MCNC: 12.3 G/DL (ref 12–16)
IMM GRANULOCYTES # BLD AUTO: 0 K/UL (ref 0–0.04)
IMM GRANULOCYTES NFR BLD AUTO: 0 % (ref 0–0.5)
LIPASE SERPL-CCNC: 47 U/L (ref 73–393)
LYMPHOCYTES # BLD: 1.5 K/UL (ref 0.9–3.6)
LYMPHOCYTES NFR BLD: 19 % (ref 21–52)
MCH RBC QN AUTO: 26.2 PG (ref 24–34)
MCHC RBC AUTO-ENTMCNC: 31.8 G/DL (ref 31–37)
MCV RBC AUTO: 82.3 FL (ref 78–100)
MONOCYTES # BLD: 0.5 K/UL (ref 0.05–1.2)
MONOCYTES NFR BLD: 7 % (ref 3–10)
NEUTS SEG # BLD: 5.7 K/UL (ref 1.8–8)
NEUTS SEG NFR BLD: 73 % (ref 40–73)
NRBC # BLD: 0 K/UL (ref 0–0.01)
NRBC BLD-RTO: 0 PER 100 WBC
PLATELET # BLD AUTO: 332 K/UL (ref 135–420)
PMV BLD AUTO: 10 FL (ref 9.2–11.8)
POTASSIUM SERPL-SCNC: 2.8 MMOL/L (ref 3.5–5.5)
POTASSIUM SERPL-SCNC: 3.7 MMOL/L (ref 3.5–5.5)
PROT SERPL-MCNC: 8 G/DL (ref 6.4–8.2)
RBC # BLD AUTO: 4.7 M/UL (ref 4.2–5.3)
SODIUM SERPL-SCNC: 137 MMOL/L (ref 136–145)
WBC # BLD AUTO: 7.9 K/UL (ref 4.6–13.2)

## 2022-10-23 PROCEDURE — 83690 ASSAY OF LIPASE: CPT

## 2022-10-23 PROCEDURE — 99285 EMERGENCY DEPT VISIT HI MDM: CPT

## 2022-10-23 PROCEDURE — 74011250637 HC RX REV CODE- 250/637: Performed by: EMERGENCY MEDICINE

## 2022-10-23 PROCEDURE — 96361 HYDRATE IV INFUSION ADD-ON: CPT

## 2022-10-23 PROCEDURE — 96365 THER/PROPH/DIAG IV INF INIT: CPT

## 2022-10-23 PROCEDURE — 96366 THER/PROPH/DIAG IV INF ADDON: CPT

## 2022-10-23 PROCEDURE — 96376 TX/PRO/DX INJ SAME DRUG ADON: CPT

## 2022-10-23 PROCEDURE — 93971 EXTREMITY STUDY: CPT

## 2022-10-23 PROCEDURE — 74177 CT ABD & PELVIS W/CONTRAST: CPT

## 2022-10-23 PROCEDURE — 74011000636 HC RX REV CODE- 636: Performed by: EMERGENCY MEDICINE

## 2022-10-23 PROCEDURE — 84132 ASSAY OF SERUM POTASSIUM: CPT

## 2022-10-23 PROCEDURE — 74011250636 HC RX REV CODE- 250/636: Performed by: EMERGENCY MEDICINE

## 2022-10-23 PROCEDURE — 85025 COMPLETE CBC W/AUTO DIFF WBC: CPT

## 2022-10-23 PROCEDURE — 96375 TX/PRO/DX INJ NEW DRUG ADDON: CPT

## 2022-10-23 RX ORDER — ONDANSETRON 2 MG/ML
4 INJECTION INTRAMUSCULAR; INTRAVENOUS ONCE
Status: COMPLETED | OUTPATIENT
Start: 2022-10-23 | End: 2022-10-23

## 2022-10-23 RX ORDER — DICYCLOMINE HYDROCHLORIDE 10 MG/1
20 CAPSULE ORAL
Status: COMPLETED | OUTPATIENT
Start: 2022-10-23 | End: 2022-10-23

## 2022-10-23 RX ORDER — AMOXICILLIN AND CLAVULANATE POTASSIUM 875; 125 MG/1; MG/1
1 TABLET, FILM COATED ORAL
Status: COMPLETED | OUTPATIENT
Start: 2022-10-23 | End: 2022-10-23

## 2022-10-23 RX ORDER — POTASSIUM CHLORIDE 7.45 MG/ML
10 INJECTION INTRAVENOUS ONCE
Status: COMPLETED | OUTPATIENT
Start: 2022-10-23 | End: 2022-10-23

## 2022-10-23 RX ORDER — MORPHINE SULFATE 4 MG/ML
4 INJECTION INTRAVENOUS ONCE
Status: COMPLETED | OUTPATIENT
Start: 2022-10-23 | End: 2022-10-23

## 2022-10-23 RX ORDER — HYDROCODONE BITARTRATE AND ACETAMINOPHEN 5; 325 MG/1; MG/1
1 TABLET ORAL
Qty: 18 TABLET | Refills: 0 | Status: SHIPPED | OUTPATIENT
Start: 2022-10-23 | End: 2022-10-26

## 2022-10-23 RX ORDER — POTASSIUM CHLORIDE 20 MEQ/1
40 TABLET, EXTENDED RELEASE ORAL
Status: COMPLETED | OUTPATIENT
Start: 2022-10-23 | End: 2022-10-23

## 2022-10-23 RX ORDER — ACETAMINOPHEN 500 MG
1000 TABLET ORAL ONCE
Status: COMPLETED | OUTPATIENT
Start: 2022-10-23 | End: 2022-10-23

## 2022-10-23 RX ORDER — AMOXICILLIN AND CLAVULANATE POTASSIUM 875; 125 MG/1; MG/1
1 TABLET, FILM COATED ORAL 2 TIMES DAILY
Qty: 19 TABLET | Refills: 0 | Status: SHIPPED | OUTPATIENT
Start: 2022-10-23 | End: 2022-11-02

## 2022-10-23 RX ADMIN — IOPAMIDOL 100 ML: 612 INJECTION, SOLUTION INTRAVENOUS at 13:55

## 2022-10-23 RX ADMIN — ONDANSETRON 4 MG: 2 INJECTION INTRAMUSCULAR; INTRAVENOUS at 12:52

## 2022-10-23 RX ADMIN — SODIUM CHLORIDE 1000 ML: 9 INJECTION, SOLUTION INTRAVENOUS at 11:53

## 2022-10-23 RX ADMIN — ONDANSETRON 4 MG: 2 INJECTION INTRAMUSCULAR; INTRAVENOUS at 11:51

## 2022-10-23 RX ADMIN — AMOXICILLIN AND CLAVULANATE POTASSIUM 1 TABLET: 875; 125 TABLET, FILM COATED ORAL at 14:58

## 2022-10-23 RX ADMIN — POTASSIUM CHLORIDE 40 MEQ: 1500 TABLET, EXTENDED RELEASE ORAL at 13:59

## 2022-10-23 RX ADMIN — MORPHINE SULFATE 4 MG: 4 INJECTION INTRAVENOUS at 11:52

## 2022-10-23 RX ADMIN — POTASSIUM CHLORIDE 10 MEQ: 7.46 INJECTION, SOLUTION INTRAVENOUS at 13:59

## 2022-10-23 RX ADMIN — ACETAMINOPHEN 1000 MG: 500 TABLET ORAL at 12:52

## 2022-10-23 RX ADMIN — DICYCLOMINE HYDROCHLORIDE 20 MG: 10 CAPSULE ORAL at 14:37

## 2022-10-23 NOTE — ED PROVIDER NOTES
EMERGENCY DEPARTMENT HISTORY AND PHYSICAL EXAM      Date: 10/23/2022  Patient Name: Sam Alas    History of Presenting Illness     Chief Complaint   Patient presents with    Vomiting    Abdominal Pain       History Provided By: Patient    History (Context): [address] is a 81 y.o. female with history of multiple episodes of diverticulitis, IBS, hypertension, hypothyroidism who presents with 1 day of left lower quadrant pain, nausea and vomiting, nonbloody diarrhea. Was all started around the same time. States it feels different than prior episodes of diverticulitis, last episode occurred approximately 6 months ago. Denies any black or bloody stools, fevers or chills. Not been able to keep any fluids down. Has not attempted to take her oral medications this morning including metoprolol for hypertension. Patient arrived via EMS. Vital signs significant for hypertension with blood pressure of 198/103. No medications given on route. Treatments attempted at home include none. Denies prior history of abdominal surgeries. No known sick contacts. Denies recent travel. PCP: Sunita Mathis MD    Current Outpatient Medications   Medication Sig Dispense Refill    amoxicillin-clavulanate (Augmentin) 875-125 mg per tablet Take 1 Tablet by mouth two (2) times a day for 10 days. 19 Tablet 0    HYDROcodone-acetaminophen (Norco) 5-325 mg per tablet Take 1 Tablet by mouth every four (4) hours as needed for Pain for up to 3 days. Max Daily Amount: 6 Tablets. 18 Tablet 0    metoprolol tartrate (LOPRESSOR) 25 mg tablet       meloxicam (MOBIC) 7.5 mg tablet       losartan-hydroCHLOROthiazide (HYZAAR) 50-12.5 mg per tablet       levothyroxine (SYNTHROID) 150 mcg tablet       gabapentin (NEURONTIN) 300 mg capsule       latanoprost (XALATAN) 0.005 % ophthalmic solution       brimonidine (ALPHAGAN) 0.2 % ophthalmic solution       aspirin 81 mg chewable tablet Take 81 mg by mouth daily.       cholecalciferol, VITAMIN D3, (VITAMIN D3) 5,000 unit tab tablet Take  by mouth daily. multivitamin (ONE A DAY) tablet Take 1 Tab by mouth daily. Past History     Past Medical History:  Past Medical History:   Diagnosis Date    A-fib [facility])     Hypertension        Past Surgical History:  No past surgical history on file. Family History:  No family history on file. Social History:  Social History     Tobacco Use    Smoking status: Former    Smokeless tobacco: Never   Substance Use Topics    Alcohol use: No    Drug use: No       Allergies:  No Known Allergies      Review of Systems   Gen: Denies fever, chills. HEENT: Denies congestion. Cardiac: Denies chest pain, palpitations  Pulm: Denies cough, shortness of breath  GI: Positive for abdominal pain, nausea, vomiting, diarrhea. Denies black or bloody stools or vomit. : Denies change in urine stream, flank pain. Neuro: Denies headache. Ext: Denies extremity pain. Skin: Denies rash. Physical Exam     Vitals:    10/23/22 1143 10/23/22 1252 10/23/22 1616   BP: (!) 142/92 (!) 132/59 (!) 130/59   Pulse: 77 72 90   Resp: 16 14 26   Temp: 98.7 °F (37.1 °C)     SpO2: 100% 99% 100%   Weight: 85.3 kg (188 lb)     Height: 5' 6\" (1.676 m)         Gen: alert and oriented, in mild distress. HEENT: Normocephalic, sclera anicteric  Cardiovascular: Normal rate, regular rhythm, no murmurs, rubs, gallops. Pulses intact and equal distally. Pulmonary: No respiratory distress. No stridor. Clear lungs. ABD: Soft, non--distended, tender in the left lower quadrant without guarding, no masses noted. Neuro: Alert. Normal speech. Normal mentation. Psych: Normal thought content and thought processes. EXT: Moves all extremities well. Mild 1+ pitting edema to bilateral distal lower extremities, more notable on the right compared to left. No erythema, induration. Nonpitting edema to right lateral ankle. Non-tender to palpation.   Skin: Warm and well-perfused. Diagnostic Study Results     Labs -     No results found for this or any previous visit (from the past 12 hour(s)). Radiologic Studies -   CT ABD PELV W CONT   Final Result      Severe rectosigmoid diverticulitis. No evidence of bowel perforation nor abscess formation. Sludge and/or stones within the gallbladder. No secondary signs of acute   cholecystitis. As clinically indicated follow-up ultrasound may be of benefit. Hepatic cysts   Small umbilical hernia containing only fat      DUPLEX LOWER EXT VENOUS RIGHT   Final Result        CT Results  (Last 48 hours)                 10/23/22 1356  CT ABD PELV W CONT Final result    Impression:      Severe rectosigmoid diverticulitis. No evidence of bowel perforation nor abscess formation. Sludge and/or stones within the gallbladder. No secondary signs of acute   cholecystitis. As clinically indicated follow-up ultrasound may be of benefit. Hepatic cysts   Small umbilical hernia containing only fat       Narrative:  EXAM: CT ABDOMEN AND PELVIS WITH CONTRAST       CLINICAL HISTORY/INDICATION:  Left lower quadrant pain, nausea, vomiting,   diarrhea, history of diverticulitis, evaluate for recurrent episode       COMPARISON: CT 10/11/2018. TECHNIQUE: Following the uneventful intravenous administration of 100 cc of   nonionic contrast, dynamic enhanced scanning of the abdomen and pelvis was   performed using standard 5 mm axial images. Coronal and sagittal reformations   obtained. All CT scans at this facility are performed using dose optimization technique as   appropriate to a performed exam, to include automated exposure control,   adjustment of the mA and/or kV according to patient's size (including   appropriate matching for site-specific examinations), or use of iterative   reconstruction technique. FINDINGS:        Abdomen -       The lung bases are clear; no pleural fluid or pneumothorax evident.        The liver and spleen are normal in size. There are multiple intrahepatic   scattered hypodensities. The largest at the right lobe measures 2.9 cm with   density measurement of water. The gallbladder is normal in size. Subtle increased density dependently. There   is no gallbladder wall thickening. The biliary tree is not dilated. There is bilateral renal function without obstruction. Cortical scarring at the   midpole. .       Adrenals and Pancreas  are of normal CT appearance. There is no free fluid or free air. Fluid within the stomach. No abnormality of the small bowel. Circumferential wall thickening of the rectosigmoid colon associated with   diverticula and surrounding stranding and small volume of fluid. No evidence of free air. No focal loculated fluid collection. The appendix is normal.       There is no significant adenopathy. Pelvis -       There is no free pelvic fluid. The pelvic viscera are unremarkable. The bladder is incompletely distended but unremarkable. Small umbilical hernia contains fat. Review of osseous structures throughout on bone window settings shows no   significant osseous pathology. CXR Results  (Last 48 hours)      None              Medical Decision Making   I am the first provider for this patient. I reviewed the vital signs, available nursing notes, past medical history, past surgical history, family history and social history. Vital Signs-Reviewed the patient's vital signs. Records Reviewed: By myself personally on initial evaluation    MDM:   Patient's clinical presentation most consistent with acute retrosigmoid diverticulitis. CT also showed cholelithiasis. Clinically, patient does not have signs or symptoms of symptomatic cholelithiasis, cholangitis, choledocholithiasis. No complicating features to diverticulitis that would warrant surgical or interventional radiology consultation for intervention.   Other DDX thought to be less likely but also considered due to high risk condition includes: Appendicitis, SBO, LBO, Mesenteric Ischemia, Cholecystitis, Cholangitis, Pancreatitis, Incarcerated/Strangulated Hernia, Perforated Peptic Ulcer Disease. Plan:  14-day course of Augmentin, Norco for severe pain, continue brat diet, gradually advance as tolerated, potassium supplementation at home    Orders as below:  Orders Placed This Encounter    ENTERIC BACTERIA PANEL, DNA    CT ABD PELV W CONT    CBC WITH AUTOMATED DIFF    COMPREHENSIVE METABOLIC PANEL    LIPASE    URINALYSIS W/ RFLX MICROSCOPIC    POTASSIUM    morphine injection 4 mg    ondansetron (ZOFRAN) injection 4 mg    sodium chloride 0.9 % bolus infusion 1,000 mL    ondansetron (ZOFRAN) injection 4 mg    acetaminophen (TYLENOL) tablet 1,000 mg    potassium chloride 10 mEq in 100 ml IVPB    potassium chloride (K-DUR, KLOR-CON M20) SR tablet 40 mEq    iopamidoL (ISOVUE 300) 61 % contrast injection  mL    dicyclomine (BENTYL) capsule 20 mg    amoxicillin-clavulanate (AUGMENTIN) 875-125 mg per tablet 1 Tablet    amoxicillin-clavulanate (Augmentin) 875-125 mg per tablet    HYDROcodone-acetaminophen (Norco) 5-325 mg per tablet        ED Course:   0947: Screening abdominal pain labs, CT abdomen/pelvis with IV contrast given prior history of recurrent diverticulitis, IV fluid was, IV morphine and Zofran for symptom relief. Add Enteric bacterial stool panel if patient able to provide a loose stool sample. Nausea and abdominal pain improving, added second dose of Zofran. We will hold off on repeat dose of morphine at this time. Incidentally, patient mentioned recent antibiotic for right ankle redness and swelling treated with Keflex within the last month. Swelling on the right leg has not returned to baseline. Plan to obtain duplex ultrasound to rule out DVT.   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 \"    Repeat K+ 3.7 after 40 mEq PO and 10 mEq IV replacement. Tolerated first dose of Augmentin. Proceed with discharge home. Patient's condition upon disposition: Improving    Disposition:  Discharge home    DISCHARGE NOTE:   Pt has been reexamined. Patient has no new complaints, changes, or physical findings. Care plan outlined and precautions discussed. Results were reviewed with the patient. All medications were reviewed with the patient; will d/c home with ride. All of pt's questions and concerns were addressed. Alarm symptoms and return precautions were discussed in detail with the patient. The patient demonstrates adequate understanding. Patient was instructed and agrees to follow up with primary care provider, as well as to return to the ED upon further deterioration. Patient is ready to go home. The patient understands and agrees with this plan. Follow-up Information       Follow up With Specialties Details Why Contact Info    Carley Tabares MD Internal Medicine Physician Call in 1 day For follow-up on your condition ideally within 1 week [place] [address] [facility] EMERGENCY DEPT Emergency Medicine  If symptoms worsen, severe intra pain, persistent vomiting, fevers greater than 100.4F [address] [facility] [address]  [phone]            Discharge Medication List as of 10/23/2022  4:11 PM        START taking these medications    Details   amoxicillin-clavulanate (Augmentin) 875-125 mg per tablet Take 1 Tablet by mouth two (2) times a day for 10 days. , Normal, Disp-19 Tablet, R-0      HYDROcodone-acetaminophen (Norco) 5-325 mg per tablet Take 1 Tablet by mouth every four (4) hours as needed for Pain for up to 3 days.  Max Daily Amount: 6 Tablets., Normal, Disp-18 Tablet, R-0           CONTINUE these medications which have NOT CHANGED    Details   metoprolol tartrate (LOPRESSOR) 25 mg tablet Historical Med      meloxicam (MOBIC) 7.5 mg tablet Historical Med      losartan-hydroCHLOROthiazide (HYZAAR) 50-12.5 mg per tablet Historical Med      levothyroxine (SYNTHROID) 150 mcg tablet Historical Med      gabapentin (NEURONTIN) 300 mg capsule Historical Med      latanoprost (XALATAN) 0.005 % ophthalmic solution Historical Med      brimonidine (ALPHAGAN) 0.2 % ophthalmic solution Historical Med      aspirin 81 mg chewable tablet Take 81 mg by mouth daily. , Historical Med      cholecalciferol, VITAMIN D3, (VITAMIN D3) 5,000 unit tab tablet Take  by mouth daily. , Historical Med      multivitamin (ONE A DAY) tablet Take 1 Tab by mouth daily. , Historical Med             Procedures:  Procedures          Diagnosis     Clinical Impression:   1. Rectosigmoid diverticulitis    2. Hypokalemia        Signed,  Hortencia Mcgraw D.O. Emergency Physician    As a voice dictation software was utilized to dictate this note, minor word transpositions can occur. I apologize for confusing wording and typographic errors. Please feel free to contact me for clarification.

## 2023-01-30 DIAGNOSIS — Z79.52 LONG TERM CURRENT USE OF SYSTEMIC STEROIDS: Primary | ICD-10-CM

## 2023-02-01 DIAGNOSIS — Z79.52 LONG TERM CURRENT USE OF SYSTEMIC STEROIDS: Primary | ICD-10-CM

## 2023-02-03 DIAGNOSIS — Z79.52 LONG TERM CURRENT USE OF SYSTEMIC STEROIDS: Primary | ICD-10-CM

## 2023-02-04 DIAGNOSIS — Z79.52 LONG TERM CURRENT USE OF SYSTEMIC STEROIDS: Primary | ICD-10-CM

## 2023-02-06 DIAGNOSIS — Z79.52 LONG TERM CURRENT USE OF SYSTEMIC STEROIDS: Primary | ICD-10-CM

## 2023-02-07 DIAGNOSIS — Z79.52 LONG TERM CURRENT USE OF SYSTEMIC STEROIDS: Primary | ICD-10-CM

## 2024-12-26 ENCOUNTER — TRANSCRIBE ORDERS (OUTPATIENT)
Facility: HOSPITAL | Age: 89
End: 2024-12-26

## 2024-12-26 DIAGNOSIS — R60.0 LOWER EXTREMITY EDEMA: Primary | ICD-10-CM
